# Patient Record
Sex: FEMALE | Race: WHITE | Employment: UNEMPLOYED | URBAN - NONMETROPOLITAN AREA
[De-identification: names, ages, dates, MRNs, and addresses within clinical notes are randomized per-mention and may not be internally consistent; named-entity substitution may affect disease eponyms.]

---

## 2023-08-14 ENCOUNTER — TELEPHONE (OUTPATIENT)
Dept: FAMILY MEDICINE | Facility: OTHER | Age: 23
End: 2023-08-14

## 2023-08-14 NOTE — TELEPHONE ENCOUNTER
Received voicemail from patient on 8/14  at 3:12 PM, regarding   Patient stated that she was given my number to schedule a follow up. Pt has not been seen by any of writers current providers.    Writer called back pt at the number provided ([phone]) and left message advising pt that writer is not sure who they needs to schedule with, but would help out if possible.   No further action needed at this time.     Opal Ruiz, on 8/14/2023  P: [phone]

## 2024-05-31 ENCOUNTER — TELEPHONE (OUTPATIENT)
Dept: NURSING | Facility: CLINIC | Age: 24
End: 2024-05-31

## 2024-05-31 NOTE — TELEPHONE ENCOUNTER
Gudelia called the [facility] RN line yesterday and left a message asking us to call her back as soon as we are able. The voicemail was hard to understand as it was it was cutting in and out. Call placed to Gudelia to see what she was calling about. Gudelia informed me she believes someone has the wrong number as she keeps getting calls from us however she is not a [employer] pt and neither are children. Informed pt I am sorry that has been happening. Informed Gudelia I am not sure who has been calling. Gudelia had no further questions at this time.